# Patient Record
Sex: MALE | Race: WHITE | NOT HISPANIC OR LATINO | Employment: FULL TIME | URBAN - METROPOLITAN AREA
[De-identification: names, ages, dates, MRNs, and addresses within clinical notes are randomized per-mention and may not be internally consistent; named-entity substitution may affect disease eponyms.]

---

## 2021-10-08 ENCOUNTER — OFFICE VISIT (OUTPATIENT)
Dept: GASTROENTEROLOGY | Facility: CLINIC | Age: 31
End: 2021-10-08
Payer: COMMERCIAL

## 2021-10-08 VITALS
SYSTOLIC BLOOD PRESSURE: 160 MMHG | DIASTOLIC BLOOD PRESSURE: 90 MMHG | HEART RATE: 89 BPM | WEIGHT: 195 LBS | BODY MASS INDEX: 27.92 KG/M2 | HEIGHT: 70 IN

## 2021-10-08 DIAGNOSIS — R19.5 CHANGE IN STOOL CALIBER: ICD-10-CM

## 2021-10-08 DIAGNOSIS — R10.11 RIGHT UPPER QUADRANT ABDOMINAL PAIN: Primary | ICD-10-CM

## 2021-10-08 DIAGNOSIS — K62.5 RECTAL BLEEDING: ICD-10-CM

## 2021-10-08 DIAGNOSIS — R11.0 NAUSEA: ICD-10-CM

## 2021-10-08 DIAGNOSIS — R19.4 CHANGE IN BOWEL HABITS: ICD-10-CM

## 2021-10-08 PROCEDURE — 99204 OFFICE O/P NEW MOD 45 MIN: CPT | Performed by: INTERNAL MEDICINE

## 2021-10-08 RX ORDER — OMEPRAZOLE 40 MG/1
40 CAPSULE, DELAYED RELEASE ORAL DAILY
COMMUNITY
Start: 2021-08-17

## 2021-10-08 RX ORDER — LEVOCETIRIZINE DIHYDROCHLORIDE 5 MG/1
5 TABLET, FILM COATED ORAL DAILY
COMMUNITY
Start: 2021-09-24

## 2021-10-29 ENCOUNTER — TELEPHONE (OUTPATIENT)
Dept: GASTROENTEROLOGY | Facility: HOSPITAL | Age: 31
End: 2021-10-29

## 2021-11-01 ENCOUNTER — ANESTHESIA EVENT (OUTPATIENT)
Dept: GASTROENTEROLOGY | Facility: HOSPITAL | Age: 31
End: 2021-11-01

## 2021-11-01 ENCOUNTER — HOSPITAL ENCOUNTER (OUTPATIENT)
Dept: GASTROENTEROLOGY | Facility: HOSPITAL | Age: 31
Setting detail: OUTPATIENT SURGERY
Discharge: HOME | End: 2021-11-01
Attending: INTERNAL MEDICINE | Admitting: INTERNAL MEDICINE
Payer: COMMERCIAL

## 2021-11-01 ENCOUNTER — ANESTHESIA (OUTPATIENT)
Dept: GASTROENTEROLOGY | Facility: HOSPITAL | Age: 31
End: 2021-11-01

## 2021-11-01 VITALS
HEIGHT: 70 IN | DIASTOLIC BLOOD PRESSURE: 85 MMHG | WEIGHT: 198.85 LBS | HEART RATE: 61 BPM | OXYGEN SATURATION: 99 % | SYSTOLIC BLOOD PRESSURE: 121 MMHG | RESPIRATION RATE: 12 BRPM | BODY MASS INDEX: 28.47 KG/M2 | TEMPERATURE: 97.4 F

## 2021-11-01 DIAGNOSIS — R19.5 CHANGE IN STOOL CALIBER: ICD-10-CM

## 2021-11-01 DIAGNOSIS — R11.0 NAUSEA: ICD-10-CM

## 2021-11-01 DIAGNOSIS — K62.5 RECTAL BLEEDING: ICD-10-CM

## 2021-11-01 DIAGNOSIS — R19.4 CHANGE IN BOWEL HABITS: ICD-10-CM

## 2021-11-01 DIAGNOSIS — R10.11 RIGHT UPPER QUADRANT ABDOMINAL PAIN: ICD-10-CM

## 2021-11-01 PROCEDURE — 43239 EGD BIOPSY SINGLE/MULTIPLE: CPT | Performed by: INTERNAL MEDICINE

## 2021-11-01 PROCEDURE — 45385 COLONOSCOPY W/LESION REMOVAL: CPT | Performed by: INTERNAL MEDICINE

## 2021-11-01 PROCEDURE — 88305 TISSUE EXAM BY PATHOLOGIST: CPT | Performed by: PATHOLOGY

## 2021-11-01 RX ORDER — FENTANYL CITRATE 50 UG/ML
INJECTION, SOLUTION INTRAMUSCULAR; INTRAVENOUS AS NEEDED
Status: DISCONTINUED | OUTPATIENT
Start: 2021-11-01 | End: 2021-11-01

## 2021-11-01 RX ORDER — SODIUM CHLORIDE, SODIUM LACTATE, POTASSIUM CHLORIDE, CALCIUM CHLORIDE 600; 310; 30; 20 MG/100ML; MG/100ML; MG/100ML; MG/100ML
125 INJECTION, SOLUTION INTRAVENOUS CONTINUOUS
Status: DISCONTINUED | OUTPATIENT
Start: 2021-11-01 | End: 2021-11-05 | Stop reason: HOSPADM

## 2021-11-01 RX ORDER — PROPOFOL 10 MG/ML
INJECTION, EMULSION INTRAVENOUS AS NEEDED
Status: DISCONTINUED | OUTPATIENT
Start: 2021-11-01 | End: 2021-11-01

## 2021-11-01 RX ORDER — LIDOCAINE HYDROCHLORIDE 20 MG/ML
INJECTION, SOLUTION INFILTRATION; PERINEURAL AS NEEDED
Status: DISCONTINUED | OUTPATIENT
Start: 2021-11-01 | End: 2021-11-01

## 2021-11-01 RX ADMIN — LIDOCAINE HYDROCHLORIDE 5 ML: 20 INJECTION, SOLUTION INFILTRATION; PERINEURAL at 11:37

## 2021-11-01 RX ADMIN — PROPOFOL 50 MG: 10 INJECTION, EMULSION INTRAVENOUS at 11:42

## 2021-11-01 RX ADMIN — FENTANYL CITRATE 50 MCG: 50 INJECTION, SOLUTION INTRAMUSCULAR; INTRAVENOUS at 11:37

## 2021-11-01 RX ADMIN — SODIUM CHLORIDE, SODIUM LACTATE, POTASSIUM CHLORIDE, AND CALCIUM CHLORIDE 125 ML/HR: .6; .31; .03; .02 INJECTION, SOLUTION INTRAVENOUS at 11:08

## 2021-11-01 RX ADMIN — PROPOFOL 50 MG: 10 INJECTION, EMULSION INTRAVENOUS at 11:50

## 2021-11-01 RX ADMIN — PROPOFOL 100 MG: 10 INJECTION, EMULSION INTRAVENOUS at 11:37

## 2021-11-01 RX ADMIN — PROPOFOL 50 MG: 10 INJECTION, EMULSION INTRAVENOUS at 11:54

## 2021-11-01 RX ADMIN — PROPOFOL 50 MG: 10 INJECTION, EMULSION INTRAVENOUS at 11:47

## 2021-11-01 RX ADMIN — PROPOFOL 30 MG: 10 INJECTION, EMULSION INTRAVENOUS at 11:39

## 2021-12-30 NOTE — H&P (VIEW-ONLY)
Otolaryngology-- Head and Neck Surgery Follow up visit      Follow up:    10/28/21:  Juliana Everett is a 36 y o  who presents with a chief complaint of throat problem for over than 1 year  March 2021 seen by an ENT  US neck ordered  Had 3 rounds of Abx over 2 months with no improvement  Had CT scan May 2021 and as per patient it was normal   The pain is always on the right side      The patient is complaining of intermittent nasal blockage, excessive post nasal drip, facial pressure, occasional headache, frequent throat clearing, however he denied any history of sneezing, itchy eyes, itchy nose, chronic nasal drainage,loss of smell, chronic cough or nasal bleeding  There is no history of nasal trauma or surgeries  Also no history of bronchial asthma or aspirin sensitivity, however there is a history of drug sniffing [id number]   no recent CT scan sinuses  no allergy skin testing   On allergy medications  Tried Flonase not sure if it helped  Tried the Afrin for long time     The patient is complaining of right ear pain  There a history of ear PE tubes during childhood  The patient denied any history of drainage, hearing loss, tinnitus or vertigo  There is also no history of ear trauma or loud noise exposure  No significant family history of hearing loss at young age  Hearing tests performed today and showed:  Tympanogram bilateral type A  Audiogram normal     On Omeperazale for GERD and heart burn      CT neck May 2021 showed  Minimal asymmetric prominence of the soft tissues of the right nasopharynx in comparison to the contralateral right nasopharynx  Noted is effacement of left piriform sinus when compared to the contralateral right piriform sinus with thickening of the mucosa  Direct visual inspection is recommended  No lymphadenopathy by size criteria   No clear underlying mass or asymmetry throughout the aerodigestive tract        US neck March 2021  In the right anterior cervical region in the area of the palpable abnormality demonstrates two benign-appearing reniform lymph nodes with a small fatty hilum measuring 1 1 x 0 4 x 1 0 cm and 1 0 x 0 3 x 0 7 cm          21:  Here for persistent right sided throat pain, otalgia and irritation for more than 1 year  The patient tried different types of Abx, steroids, PPIs  floanse and saline rinse with no improvement  The patient is pointing towards his right tonsils  Old CT scan report at [facility] showed right tonsil calcification  Review of any relevant imaging:      Interval Review of systems: Pertinent review of systems documented in the HPI      Interval Social History:  Social History     Socioeconomic History    Marital status: Unknown     Spouse name: Not on file    Number of children: Not on file    Years of education: Not on file    Highest education level: Not on file   Occupational History    Not on file   Tobacco Use    Smoking status: Former Smoker     Packs/day: 0 50     Years: 15 00     Pack years: 7 50     Types: Cigarettes     Quit date: 2021     Years since quittin 4    Smokeless tobacco: Never Used   Vaping Use    Vaping Use: Never used   Substance and Sexual Activity    Alcohol use: Never    Drug use: Yes     Frequency: 7 0 times per week     Types: Marijuana     Comment: last usage 10/30/2021    Sexual activity: Not on file   Other Topics Concern    Not on file   Social History Narrative    Not on file     Social Determinants of Health     Financial Resource Strain: Not on file   Food Insecurity: Not on file   Transportation Needs: Not on file   Physical Activity: Not on file   Stress: Not on file   Social Connections: Not on file   Intimate Partner Violence: Not on file   Housing Stability: Not on file       Interval Physical Examination:  Temp (!) 97 3 °F (36 3 °C) (Temporal)   Ht 5' 10" (1 778 m)   Wt 95 3 kg (210 lb)   BMI 30 13 kg/m²   Head: Atraumatic, no visible scalp lesions, parotid and submandibular salivary glands non-tender to palpation and without masses bilaterally  Neck:  No visible or palpable cervical lesions or lymphadenopathy, thyroid gland is normal in size and symmetry and without masses, normal laryngeal elevation with swallowing  Ears:    Right ear :  Auricle normal in appearance, mastoid prominence non-tender, external auditory canal clear  Tympanic membranes intact  Left ear :  Auricle normal in appearance, mastoid prominence non-tender, external auditory canal clear   Tympanic membranes intact  Nose/Sinuses:  External appearance unremarkable, no maxillary or frontal sinus tenderness to palpation bilaterally  Anterior rhinoscopy reveals: thick mucus, pale mucosa  Oral Cavity:  Moist mucus membranes, gums and dentition unremarkable, no oral mucosal masses or lesions, floor of mouth soft, grade 1 + asymmetrical cryptic tonsils large on the right side       Flexible laryngoscopy performed:     Fiberoptic scope advanced through left nare, findings:  Nasal cavity: Septum deviated to the left, no polyps or mucopus  Nasopharynx: unremarkable, no masses or lesions, eustachian tube orifi and Fossae of Rosenmuller unremakable  Oropharynx: mucosa moist, no masses or lesions, tongue base, lateral and posterior walls normal  Larynx: True vocal folds mobile, no masses or lesions, widely patent glottic chink, arytenoid edema and erythema, evidence of PND  Hypopharynx: Pyriform sinuses clear without masses or pooling  Post-cricoid area unremarkable       Eyes:  Extra-ocular movements intact, pupils equally round and reactive to light and accommodation, the lids and conjunctivae are normal in appearance  Constitutional:  Well developed, well nourished and groomed, in no acute distress  Cardiovascular:  Normal rate and rhythm, no palpable thrills, no jugulovenous distension observed    Respiratory:  Normal respiratory effort without evidence of retractions or use of accessory muscles  Neurologic:  Cranial nerves II-XII intact bilaterally  Abdomen: Soft and lax  Extremities: No bruises   Psychiatric:  Alert and oriented to time, place and person  Procedure:  Flexible laryngoscopy performed: The nasal cavities were decongested with lidocaine and oxymetazoline spray  Endoscopy type: flexible  Results: look to the examination  The patient tolerated the procedure well  Assessment:  1  Otalgia of right ear  CT soft tissue neck with contrast   2  Odynophagia     3  Dysphagia, unspecified type     4  Rhinitis, chronic     5  Dysfunction of right eustachian tube     6  Gastroesophageal reflux disease without esophagitis     7  Post-nasal drip     8  Facial pressure     9  History of myringotomy     10  Rhinitis medicamentosa     11  Normal hearing test         Plan:    GERD  Continue on Omeprazole      Rhinitis, PND and rhinitis medica mentosa  Discontinue the nasal decongestant   Discussed treatment options including use of saline rinses and nasal steroids  Given instructions on use of nasal steroids and saline rinses          Right constant otalgia, throat pain and odynophagia  Plan to order CT neck to r/o foreign body in the right tonsills, tonsil or hypopharyngeal mass and long calcified styloid

## 2022-01-07 ENCOUNTER — HOSPITAL ENCOUNTER (OUTPATIENT)
Dept: RADIOLOGY | Facility: MEDICAL CENTER | Age: 32
Discharge: HOME | End: 2022-01-07
Payer: COMMERCIAL

## 2022-01-07 DIAGNOSIS — H92.01 OTALGIA OF RIGHT EAR: ICD-10-CM

## 2022-01-07 PROCEDURE — G1004 CDSM NDSC: HCPCS

## 2022-01-07 PROCEDURE — 70491 CT SOFT TISSUE NECK W/DYE: CPT

## 2022-01-07 RX ADMIN — IOHEXOL 100 ML: 350 INJECTION, SOLUTION INTRAVENOUS at 09:53

## 2022-01-14 ENCOUNTER — TELEPHONE (OUTPATIENT)
Dept: GASTROENTEROLOGY | Facility: CLINIC | Age: 32
End: 2022-01-14

## 2022-01-14 NOTE — TELEPHONE ENCOUNTER
Patients GI provider:  Dr Nash Boeck    Number to return call: [phone]    Reason for call: Pt calling looking for his results from egd/colon procedure from Nov    Scheduled procedure/appointment date if applicable: NA

## 2022-01-20 NOTE — PRE-PROCEDURE INSTRUCTIONS
Pre-Surgery Instructions:   Medication Instructions    levocetirizine (XYZAL) 5 MG tablet Instructed to take per normal schedule except DOS    omeprazole (PriLOSEC) 40 MG capsule Instructed to take per normal schedule including DOS with sips water   Have you had / have a sore throat? No  Have you had / have a cough less than 1 week? No  Have you had / have a fever greater than 100 0 - 100  4? No  Are you experiencing any shortness of breath? No    Review with patient via phone medications and showering instructions  Advised don't take NSAID's, ok tylenol products  Advised ASC call with surgery schedule time, nothing eat or drink after midnight  Verbalized understanding

## 2022-01-21 ENCOUNTER — APPOINTMENT (OUTPATIENT)
Dept: LAB | Facility: HOSPITAL | Age: 32
End: 2022-01-21
Payer: COMMERCIAL

## 2022-01-21 DIAGNOSIS — J35.8 TONSILLAR MASS: ICD-10-CM

## 2022-01-21 LAB
ANION GAP SERPL CALCULATED.3IONS-SCNC: 9 MMOL/L (ref 4–13)
BASOPHILS # BLD AUTO: 0.06 THOUSANDS/ΜL (ref 0–0.1)
BASOPHILS NFR BLD AUTO: 1 % (ref 0–1)
BUN SERPL-MCNC: 14 MG/DL (ref 5–25)
CALCIUM SERPL-MCNC: 9.5 MG/DL (ref 8.3–10.1)
CHLORIDE SERPL-SCNC: 103 MMOL/L (ref 100–108)
CO2 SERPL-SCNC: 29 MMOL/L (ref 21–32)
CREAT SERPL-MCNC: 0.99 MG/DL (ref 0.6–1.3)
EOSINOPHIL # BLD AUTO: 0.4 THOUSAND/ΜL (ref 0–0.61)
EOSINOPHIL NFR BLD AUTO: 5 % (ref 0–6)
ERYTHROCYTE [DISTWIDTH] IN BLOOD BY AUTOMATED COUNT: 12.6 % (ref 11.6–15.1)
GFR SERPL CREATININE-BSD FRML MDRD: 102 ML/MIN/1.73SQ M
GLUCOSE P FAST SERPL-MCNC: 93 MG/DL (ref 65–99)
HCT VFR BLD AUTO: 45.5 % (ref 36.5–49.3)
HGB BLD-MCNC: 15.4 G/DL (ref 12–17)
IMM GRANULOCYTES # BLD AUTO: 0.02 THOUSAND/UL (ref 0–0.2)
IMM GRANULOCYTES NFR BLD AUTO: 0 % (ref 0–2)
LYMPHOCYTES # BLD AUTO: 2.67 THOUSANDS/ΜL (ref 0.6–4.47)
LYMPHOCYTES NFR BLD AUTO: 34 % (ref 14–44)
MCH RBC QN AUTO: 30.9 PG (ref 26.8–34.3)
MCHC RBC AUTO-ENTMCNC: 33.8 G/DL (ref 31.4–37.4)
MCV RBC AUTO: 91 FL (ref 82–98)
MONOCYTES # BLD AUTO: 0.57 THOUSAND/ΜL (ref 0.17–1.22)
MONOCYTES NFR BLD AUTO: 7 % (ref 4–12)
NEUTROPHILS # BLD AUTO: 4.17 THOUSANDS/ΜL (ref 1.85–7.62)
NEUTS SEG NFR BLD AUTO: 53 % (ref 43–75)
NRBC BLD AUTO-RTO: 0 /100 WBCS
PLATELET # BLD AUTO: 278 THOUSANDS/UL (ref 149–390)
PMV BLD AUTO: 10.4 FL (ref 8.9–12.7)
POTASSIUM SERPL-SCNC: 3.8 MMOL/L (ref 3.5–5.3)
RBC # BLD AUTO: 4.99 MILLION/UL (ref 3.88–5.62)
SODIUM SERPL-SCNC: 141 MMOL/L (ref 136–145)
WBC # BLD AUTO: 7.89 THOUSAND/UL (ref 4.31–10.16)

## 2022-01-21 PROCEDURE — 80048 BASIC METABOLIC PNL TOTAL CA: CPT

## 2022-01-21 PROCEDURE — 85025 COMPLETE CBC W/AUTO DIFF WBC: CPT

## 2022-01-21 PROCEDURE — 36415 COLL VENOUS BLD VENIPUNCTURE: CPT

## 2022-01-27 ENCOUNTER — ANESTHESIA EVENT (OUTPATIENT)
Dept: PERIOP | Facility: HOSPITAL | Age: 32
End: 2022-01-27
Payer: COMMERCIAL

## 2022-01-28 ENCOUNTER — ANESTHESIA (OUTPATIENT)
Dept: PERIOP | Facility: HOSPITAL | Age: 32
End: 2022-01-28
Payer: COMMERCIAL

## 2022-01-28 ENCOUNTER — HOSPITAL ENCOUNTER (OUTPATIENT)
Facility: HOSPITAL | Age: 32
Setting detail: OUTPATIENT SURGERY
Discharge: HOME | End: 2022-01-28
Attending: STUDENT IN AN ORGANIZED HEALTH CARE EDUCATION/TRAINING PROGRAM | Admitting: STUDENT IN AN ORGANIZED HEALTH CARE EDUCATION/TRAINING PROGRAM
Payer: COMMERCIAL

## 2022-01-28 VITALS
SYSTOLIC BLOOD PRESSURE: 136 MMHG | OXYGEN SATURATION: 99 % | TEMPERATURE: 96.7 F | RESPIRATION RATE: 15 BRPM | DIASTOLIC BLOOD PRESSURE: 83 MMHG | HEIGHT: 70 IN | BODY MASS INDEX: 30.06 KG/M2 | HEART RATE: 63 BPM | WEIGHT: 210 LBS

## 2022-01-28 DIAGNOSIS — Z90.89 S/P TONSILLECTOMY: Primary | ICD-10-CM

## 2022-01-28 PROCEDURE — 42826 REMOVAL OF TONSILS: CPT | Performed by: STUDENT IN AN ORGANIZED HEALTH CARE EDUCATION/TRAINING PROGRAM

## 2022-01-28 RX ORDER — SUCCINYLCHOLINE/SOD CL,ISO/PF 100 MG/5ML
SYRINGE (ML) INTRAVENOUS AS NEEDED
Status: DISCONTINUED | OUTPATIENT
Start: 2022-01-28 | End: 2022-01-28

## 2022-01-28 RX ORDER — OXYCODONE HYDROCHLORIDE 5 MG/1
5 TABLET ORAL EVERY 6 HOURS PRN
Qty: 1 TABLET | Refills: 0 | Status: SHIPPED | OUTPATIENT
Start: 2022-01-28 | End: 2022-01-28

## 2022-01-28 RX ORDER — HYDROMORPHONE HCL/PF 1 MG/ML
0.5 SYRINGE (ML) INJECTION ONCE
Status: DISCONTINUED | OUTPATIENT
Start: 2022-01-28 | End: 2022-01-28 | Stop reason: HOSPADM

## 2022-01-28 RX ORDER — LIDOCAINE HYDROCHLORIDE 10 MG/ML
INJECTION, SOLUTION EPIDURAL; INFILTRATION; INTRACAUDAL; PERINEURAL AS NEEDED
Status: DISCONTINUED | OUTPATIENT
Start: 2022-01-28 | End: 2022-01-28

## 2022-01-28 RX ORDER — OXYCODONE HYDROCHLORIDE 5 MG/1
5 TABLET ORAL EVERY 4 HOURS PRN
Status: DISCONTINUED | OUTPATIENT
Start: 2022-01-28 | End: 2022-01-28 | Stop reason: HOSPADM

## 2022-01-28 RX ORDER — FENTANYL CITRATE/PF 50 MCG/ML
25 SYRINGE (ML) INJECTION
Status: COMPLETED | OUTPATIENT
Start: 2022-01-28 | End: 2022-01-28

## 2022-01-28 RX ORDER — MAGNESIUM HYDROXIDE 1200 MG/15ML
LIQUID ORAL AS NEEDED
Status: DISCONTINUED | OUTPATIENT
Start: 2022-01-28 | End: 2022-01-28 | Stop reason: HOSPADM

## 2022-01-28 RX ORDER — ONDANSETRON 2 MG/ML
INJECTION INTRAMUSCULAR; INTRAVENOUS AS NEEDED
Status: DISCONTINUED | OUTPATIENT
Start: 2022-01-28 | End: 2022-01-28

## 2022-01-28 RX ORDER — FENTANYL CITRATE 50 UG/ML
INJECTION, SOLUTION INTRAMUSCULAR; INTRAVENOUS AS NEEDED
Status: DISCONTINUED | OUTPATIENT
Start: 2022-01-28 | End: 2022-01-28

## 2022-01-28 RX ORDER — ACETAMINOPHEN 160 MG/5ML
640 SUSPENSION ORAL EVERY 4 HOURS PRN
Qty: 473 ML | Refills: 0 | Status: SHIPPED | OUTPATIENT
Start: 2022-01-28 | End: 2022-01-31

## 2022-01-28 RX ORDER — DEXAMETHASONE SODIUM PHOSPHATE 10 MG/ML
INJECTION, SOLUTION INTRAMUSCULAR; INTRAVENOUS AS NEEDED
Status: DISCONTINUED | OUTPATIENT
Start: 2022-01-28 | End: 2022-01-28

## 2022-01-28 RX ORDER — SODIUM CHLORIDE, SODIUM LACTATE, POTASSIUM CHLORIDE, CALCIUM CHLORIDE 600; 310; 30; 20 MG/100ML; MG/100ML; MG/100ML; MG/100ML
125 INJECTION, SOLUTION INTRAVENOUS CONTINUOUS
Status: DISCONTINUED | OUTPATIENT
Start: 2022-01-28 | End: 2022-01-28 | Stop reason: HOSPADM

## 2022-01-28 RX ORDER — ONDANSETRON 2 MG/ML
4 INJECTION INTRAMUSCULAR; INTRAVENOUS ONCE AS NEEDED
Status: DISCONTINUED | OUTPATIENT
Start: 2022-01-28 | End: 2022-01-28 | Stop reason: HOSPADM

## 2022-01-28 RX ORDER — PROPOFOL 10 MG/ML
INJECTION, EMULSION INTRAVENOUS AS NEEDED
Status: DISCONTINUED | OUTPATIENT
Start: 2022-01-28 | End: 2022-01-28

## 2022-01-28 RX ORDER — LIDOCAINE HYDROCHLORIDE 10 MG/ML
0.5 INJECTION, SOLUTION EPIDURAL; INFILTRATION; INTRACAUDAL; PERINEURAL ONCE AS NEEDED
Status: DISCONTINUED | OUTPATIENT
Start: 2022-01-28 | End: 2022-01-28 | Stop reason: HOSPADM

## 2022-01-28 RX ADMIN — DEXAMETHASONE SODIUM PHOSPHATE 4 MG: 10 INJECTION, SOLUTION INTRAMUSCULAR; INTRAVENOUS at 08:46

## 2022-01-28 RX ADMIN — FENTANYL CITRATE 50 MCG: 50 INJECTION INTRAMUSCULAR; INTRAVENOUS at 08:33

## 2022-01-28 RX ADMIN — FENTANYL CITRATE 50 MCG: 50 INJECTION INTRAMUSCULAR; INTRAVENOUS at 08:46

## 2022-01-28 RX ADMIN — OXYCODONE HYDROCHLORIDE 5 MG: 5 TABLET ORAL at 11:51

## 2022-01-28 RX ADMIN — LIDOCAINE HYDROCHLORIDE 50 MG: 10 INJECTION, SOLUTION EPIDURAL; INFILTRATION; INTRACAUDAL; PERINEURAL at 08:37

## 2022-01-28 RX ADMIN — Medication 25 MCG: at 09:47

## 2022-01-28 RX ADMIN — ONDANSETRON 4 MG: 2 INJECTION INTRAMUSCULAR; INTRAVENOUS at 08:46

## 2022-01-28 RX ADMIN — PROPOFOL 200 MG: 10 INJECTION, EMULSION INTRAVENOUS at 08:33

## 2022-01-28 RX ADMIN — Medication 25 MCG: at 10:03

## 2022-01-28 RX ADMIN — Medication 25 MCG: at 09:54

## 2022-01-28 RX ADMIN — Medication 25 MCG: at 10:12

## 2022-01-28 RX ADMIN — Medication 100 MG: at 08:33

## 2022-01-28 RX ADMIN — SODIUM CHLORIDE, SODIUM LACTATE, POTASSIUM CHLORIDE, AND CALCIUM CHLORIDE 125 ML/HR: .6; .31; .03; .02 INJECTION, SOLUTION INTRAVENOUS at 07:01

## 2022-01-28 NOTE — INTERVAL H&P NOTE
Plan for tonsillectomy and possible adenoidectomy  Risks and benefits explained to the mother in details including but not limited to bleeding, infection and injury to surrounding structures  H&P reviewed  After examining the patient I find no changes in the patients condition since the H&P had been written      Vitals:    01/28/22 0647   BP: 145/84   Pulse: 60   Resp: 19   Temp: 97 8 °F (36 6 °C)   SpO2: 99%

## 2022-01-28 NOTE — ANESTHESIA PREPROCEDURE EVALUATION
Procedure:  TONSILLECTOMY (N/A Throat)    Relevant Problems   No relevant active problems        Physical Exam    Airway    Mallampati score: II         Dental   No notable dental hx     Cardiovascular      Pulmonary      Other Findings        Anesthesia Plan  ASA Score- 2     Anesthesia Type- general with ASA Monitors  Additional Monitors:   Airway Plan: ETT  Comment: I, Dr Mariola Steele, the attending physician, have personally seen and evaluated the patient prior to anesthetic care  I have reviewed the pre-anesthetic record, and other medical records if appropriate to the anesthetic care  If a CRNA is involved in the case, I have reviewed the CRNA assessment, if present, and agree  The patient is in a suitable condition to proceed with my formulated anesthetic plan          Plan Factors-    Chart reviewed  Induction- intravenous  Postoperative Plan-     Informed Consent- Anesthetic plan and risks discussed with patient  I personally reviewed this patient with the CRNA  Discussed and agreed on the Anesthesia Plan with the CRNA  Castillo Aguirre

## 2022-01-28 NOTE — DISCHARGE INSTRUCTIONS
Tonsillectomy   WHAT YOU NEED TO KNOW:   A tonsillectomy is surgery to remove your tonsils  Tonsils are 2 large lumps of tissue in the back of your throat  Your tonsils may need to be removed if you have frequent throat infections or trouble breathing during sleep  DISCHARGE INSTRUCTIONS:   Seek care immediately if:   · You have trouble breathing  · You have signs of dehydration, such as dry mouth or eyes  You may urinate less than usual or not at all  · You have severe pain  · You are vomiting  · You have a fever higher than 102°F (39°C), or a low-grade fever for longer than 2 days  · You have bright red bleeding from your throat, nose, or mouth, or your bleeding that gets worse  Call your surgeon or doctor if:   · You have new or worsening symptoms  · You have questions or concerns about your condition or care  Medicines: You may  need any of the following:  · Prescription pain medicine  may be given  Ask your healthcare provider how to take this medicine safely  Some prescription pain medicines contain acetaminophen  Do not take other medicines that contain acetaminophen without talking to your healthcare provider  Too much acetaminophen may cause liver damage  Prescription pain medicine may cause constipation  Ask your healthcare provider how to prevent or treat constipation  · Acetaminophen  decreases pain and fever  It is available without a doctor's order  Ask how much to take and how often to take it  Follow directions  Read the labels of all other medicines you are using to see if they also contain acetaminophen, or ask your doctor or pharmacist  Acetaminophen can cause liver damage if not taken correctly  Do not use more than 4 grams (4,000 milligrams) total of acetaminophen in one day  · NSAIDs , such as ibuprofen, help decrease swelling, pain, and fever  NSAIDs can cause stomach bleeding or kidney problems in certain people   If you take blood thinner medicine, always ask your healthcare provider if NSAIDs are safe for you  Always read the medicine label and follow directions  · Take your medicine as directed  Contact your healthcare provider if you think your medicine is not helping or if you have side effects  Tell him or her if you are allergic to any medicine  Keep a list of the medicines, vitamins, and herbs you take  Include the amounts, and when and why you take them  Bring the list or the pill bottles to follow-up visits  Carry your medicine list with you in case of an emergency  Self-care:   · Drink liquids as directed  This will help prevent dehydration  Liquids and foods that are cool or cold, such as water, apple or grape juice, and popsicles, will help decrease pain and swelling  Do not drink citrus juices, such as orange juice or grapefruit juice  They may irritate your throat  Do not drink hot liquids such as coffee, tea, or soup  These liquids may hurt your throat  · Do not use straws  for up to 2 weeks, or as directed by your healthcare provider  Drinking from a straw may increase your risk for bleeding  · Eat soft foods  for 10 to 14 days to decrease pain while you eat  Examples of soft foods include applesauce, scrambled or boiled eggs, mashed potatoes, gelatin, and ice cream  Once you can eat soft food easily, you may slowly begin to eat solid foods  Avoid anything hot, spicy, or with sharp edges, such as chips  · Care for your mouth as directed  Gently rinse your mouth as directed to remove blood and mucus  The white scabs that will form in the back of your throat will cause bad breath  This is normal  Brush your teeth gently  Avoid harsh gargling or tooth brushing  This can cause bleeding  · Limit your activity for 7 to 10 days after surgery  Get plenty of rest  It may take 2 weeks for you to recover  Ask when you can drive or return to work  · Do not smoke    Nicotine and other chemicals in cigarettes and cigars can prevent healing after surgery  Ask your healthcare provider for information if you currently smoke and need help to quit  E-cigarettes or smokeless tobacco still contain nicotine  Talk to your healthcare provider before you use these products  Follow up with your surgeon or doctor as directed:  Write down your questions so you remember to ask them during your visits  © [employer] 2021 Information is for End User's use only and may not be sold, redistributed or otherwise used for commercial purposes  All illustrations and images included in CareNotes® are the copyrighted property of [employer]  or [facility]   The above information is an  only  It is not intended as medical advice for individual conditions or treatments  Talk to your doctor, nurse or pharmacist before following any medical regimen to see if it is safe and effective for you

## 2022-01-28 NOTE — PROGRESS NOTES
I find no changes with the H and P performed on 12/30/21 as shown below  Patient with right otalgia and tonsillar asymmetry here for tonsillectomy  Risks, benefits, alternatives, and typical post operative course reviewed with the patient  All of the patient's questions were answered to the patient's satisfaction  Patient agrees to proceed with the procedure  Otolaryngology-- Head and Neck Surgery Follow up visit 12/30/21      Follow up:    10/28/21:  Edouard Doctor is a 36 y o  who presents with a chief complaint of throat problem for over than 1 year  March 2021 seen by an ENT  US neck ordered  Had 3 rounds of Abx over 2 months with no improvement  Had CT scan May 2021 and as per patient it was normal   The pain is always on the right side      The patient is complaining of intermittent nasal blockage, excessive post nasal drip, facial pressure, occasional headache, frequent throat clearing, however he denied any history of sneezing, itchy eyes, itchy nose, chronic nasal drainage,loss of smell, chronic cough or nasal bleeding  There is no history of nasal trauma or surgeries  Also no history of bronchial asthma or aspirin sensitivity, however there is a history of drug sniffing [id number]   no recent CT scan sinuses  no allergy skin testing   On allergy medications  Tried Flonase not sure if it helped  Tried the Afrin for long time     The patient is complaining of right ear pain  There a history of ear PE tubes during childhood  The patient denied any history of drainage, hearing loss, tinnitus or vertigo  There is also no history of ear trauma or loud noise exposure  No significant family history of hearing loss at young age    Hearing tests performed today and showed:  Tympanogram bilateral type A  Audiogram normal     On Omeperazale for GERD and heart burn      CT neck May 2021 showed  Minimal asymmetric prominence of the soft tissues of the right nasopharynx in comparison to the contralateral right nasopharynx  Noted is effacement of left piriform sinus when compared to the contralateral right piriform sinus with thickening of the mucosa  Direct visual inspection is recommended  No lymphadenopathy by size criteria  No clear underlying mass or asymmetry throughout the aerodigestive tract        US neck 2021  In the right anterior cervical region in the area of the palpable abnormality demonstrates two benign-appearing reniform lymph nodes with a small fatty hilum measuring 1 1 x 0 4 x 1 0 cm and 1 0 x 0 3 x 0 7 cm          21:  Here for persistent right sided throat pain, otalgia and irritation for more than 1 year  The patient tried different types of Abx, steroids, PPIs  floanse and saline rinse with no improvement  The patient is pointing towards his right tonsils  Old CT scan report at [facility] [place] showed right tonsil calcification  Review of any relevant imaging:      Interval Review of systems: Pertinent review of systems documented in the HPI      Interval Social History:  Social History     Socioeconomic History    Marital status: Unknown     Spouse name: Not on file    Number of children: Not on file    Years of education: Not on file    Highest education level: Not on file   Occupational History    Not on file   Tobacco Use    Smoking status: Former Smoker     Packs/day: 0 50     Years: 15 00     Pack years: 7 50     Types: Cigarettes     Quit date: 2021     Years since quittin 5    Smokeless tobacco: Never Used   Vaping Use    Vaping Use: Never used   Substance and Sexual Activity    Alcohol use: Never    Drug use: Yes     Frequency: 7 0 times per week     Types: Marijuana    Sexual activity: Not on file   Other Topics Concern    Not on file   Social History Narrative    Not on file     Social Determinants of Health     Financial Resource Strain: Not on file   Food Insecurity: Not on file   Transportation Needs: Not on file   Physical Activity: Not on file Stress: Not on file   Social Connections: Not on file   Intimate Partner Violence: Not on file   Housing Stability: Not on file       Interval Physical Examination:  /84   Pulse 60   Temp 97 8 °F (36 6 °C) (Temporal)   Resp 19   Ht 5' 10" (1 778 m)   Wt 95 3 kg (210 lb)   SpO2 99%   BMI 30 13 kg/m²   Head: Atraumatic, no visible scalp lesions, parotid and submandibular salivary glands non-tender to palpation and without masses bilaterally  Neck:  No visible or palpable cervical lesions or lymphadenopathy, thyroid gland is normal in size and symmetry and without masses, normal laryngeal elevation with swallowing  Ears:    Right ear :  Auricle normal in appearance, mastoid prominence non-tender, external auditory canal clear  Tympanic membranes intact  Left ear :  Auricle normal in appearance, mastoid prominence non-tender, external auditory canal clear   Tympanic membranes intact  Nose/Sinuses:  External appearance unremarkable, no maxillary or frontal sinus tenderness to palpation bilaterally  Anterior rhinoscopy reveals: thick mucus, pale mucosa  Oral Cavity:  Moist mucus membranes, gums and dentition unremarkable, no oral mucosal masses or lesions, floor of mouth soft, grade 1 + asymmetrical cryptic tonsils large on the right side       Flexible laryngoscopy performed:     Fiberoptic scope advanced through left nare, findings:  Nasal cavity: Septum deviated to the left, no polyps or mucopus  Nasopharynx: unremarkable, no masses or lesions, eustachian tube orifi and Fossae of Rosenmuller unremakable  Oropharynx: mucosa moist, no masses or lesions, tongue base, lateral and posterior walls normal  Larynx: True vocal folds mobile, no masses or lesions, widely patent glottic chink, arytenoid edema and erythema, evidence of PND  Hypopharynx: Pyriform sinuses clear without masses or pooling   Post-cricoid area unremarkable       Eyes:  Extra-ocular movements intact, pupils equally round and reactive to light and accommodation, the lids and conjunctivae are normal in appearance  Constitutional:  Well developed, well nourished and groomed, in no acute distress  Cardiovascular:  Normal rate and rhythm, no palpable thrills, no jugulovenous distension observed  Respiratory:  Normal respiratory effort without evidence of retractions or use of accessory muscles  Neurologic:  Cranial nerves II-XII intact bilaterally  Abdomen: Soft and lax  Extremities: No bruises   Psychiatric:  Alert and oriented to time, place and person  Procedure:  Flexible laryngoscopy performed: The nasal cavities were decongested with lidocaine and oxymetazoline spray  Endoscopy type: flexible  Results: look to the examination  The patient tolerated the procedure well  Assessment:  No diagnosis found  Plan:    GERD  Continue on Omeprazole      Rhinitis, PND and rhinitis medica mentosa  Discontinue the nasal decongestant   Discussed treatment options including use of saline rinses and nasal steroids  Given instructions on use of nasal steroids and saline rinses          Right constant otalgia, throat pain and odynophagia  Plan to order CT neck to r/o foreign body in the right tonsills, tonsil or hypopharyngeal mass and long calcified styloid

## 2022-01-28 NOTE — ANESTHESIA POSTPROCEDURE EVALUATION
Post-Op Assessment Note    CV Status:  Stable  Pain Score: 0    Pain management: satisfactory to patient     Mental Status:  Alert   Hydration Status:  Stable   PONV Controlled:  None   Airway Patency:  Patent  Airway: intubated      Post Op Vitals Reviewed: Yes      Staff: CRNA         No complications documented      /75 (01/28/22 0941)    Temp (!) 97 1 °F (36 2 °C) (01/28/22 0941)    Pulse 80 (01/28/22 0941)   Resp   16   SpO2 100 % (01/28/22 0941)

## 2022-01-28 NOTE — OP NOTE
PERATIVE REPORT  PATIENT NAME: Edouard Ramos    :  1990  MRN: [id number]  Pt Location:  OR ROOM 06    SURGERY DATE: 2022    Surgeon(s) and Role:     * Orquidea Lopez MD - Primary     * Heather Jarrell MD - Assisting    Preop Diagnosis:  Tonsillar mass [J35 8]    Post-Op Diagnosis Codes:     * Tonsillar mass [J35 8]    Procedure(s) (LRB):  TONSILLECTOMY (N/A)    Specimen(s):  * No specimens in log *    Estimated Blood Loss:   Minimal    Drains:  * No LDAs found *    Anesthesia Type:   General    Operative Indications: Tonsillar mass [J35 8]  Tonsillar stones    Operative Findings:  Grade 3 + tonsills    Complications:   None    Procedure and Technique:  The patient was positively identified and transferred onto the operating table in the supine position  Appropriate monitoring devices were put in place, anesthesia was induced and the patient was intubated without difficulty  The operating room table was then turned 90 degrees, and a shoulder roll was placed  Before proceeding further, the time out procedure was completed  A McIvor oral gag was introduced opened and suspended from the edge of the Mandujano stand  Palpation of the hard palate revealed no submucosal cleft  Red rubber tubes were passed through bilateral nasal cavities and used to retract the soft palate bilaterally  The right tonsil was grasped, retracted medially and dissected free of the surrounding tissue using the Coblation wand  In a similar fashion, the left tonsil was removed, and hemostasis was accomplished in bilateral tonsillar fossae using the coagulation function of the Coblation wand  Attention was directed to the nasopharynx, where no enlarged adenoids noticed  The McIvor oral gag was let down for a minute and reopened  Hemostasis was again accomplished using the coagulation function of the Coablation wand  The red rubber tubes and the McIvor oral gag were then removed  Anesthesia was reversed   The patient was awakened, extubated and taken to the recovery room in stable condition  All counts were correct at the end of the case, and no complications were encountered       I was present for the entire procedure    Patient Disposition:  PACU       SIGNATURE: Maurisio Aguirre MD  DATE: January 28, 2022  TIME: 9:30 AM

## 2022-01-31 ENCOUNTER — TELEPHONE (OUTPATIENT)
Dept: OTOLARYNGOLOGY | Facility: CLINIC | Age: 32
End: 2022-01-31

## 2022-01-31 DIAGNOSIS — Z90.89 S/P TONSILLECTOMY: Primary | ICD-10-CM

## 2022-01-31 RX ORDER — ACETAMINOPHEN 160 MG/5ML
1000 SUSPENSION ORAL EVERY 4 HOURS PRN
Qty: 1000 ML | Refills: 0 | Status: SHIPPED | OUTPATIENT
Start: 2022-01-31

## 2022-01-31 RX ORDER — METHYLPREDNISOLONE 4 MG/1
TABLET ORAL
Qty: 1 TABLET | Refills: 0 | Status: SHIPPED | OUTPATIENT
Start: 2022-01-31

## 2022-06-10 ENCOUNTER — HOSPITAL ENCOUNTER (OUTPATIENT)
Dept: RADIOLOGY | Facility: MEDICAL CENTER | Age: 32
Discharge: HOME | End: 2022-06-10
Payer: COMMERCIAL

## 2022-06-10 DIAGNOSIS — R10.11 RIGHT UPPER QUADRANT PAIN: ICD-10-CM

## 2022-06-10 PROCEDURE — 74176 CT ABD & PELVIS W/O CONTRAST: CPT

## 2022-06-10 PROCEDURE — G1004 CDSM NDSC: HCPCS

## 2022-06-24 ENCOUNTER — APPOINTMENT (OUTPATIENT)
Dept: LAB | Facility: HOSPITAL | Age: 32
End: 2022-06-24
Payer: COMMERCIAL

## 2022-06-24 ENCOUNTER — HOSPITAL ENCOUNTER (OUTPATIENT)
Dept: MRI IMAGING | Facility: HOSPITAL | Age: 32
Discharge: HOME | End: 2022-06-24
Attending: STUDENT IN AN ORGANIZED HEALTH CARE EDUCATION/TRAINING PROGRAM
Payer: COMMERCIAL

## 2022-06-24 DIAGNOSIS — K11.20 SIALOADENITIS: ICD-10-CM

## 2022-06-24 LAB
ANION GAP SERPL CALCULATED.3IONS-SCNC: 9 MMOL/L (ref 4–13)
BUN SERPL-MCNC: 13 MG/DL (ref 5–25)
CALCIUM SERPL-MCNC: 9.1 MG/DL (ref 8.3–10.1)
CHLORIDE SERPL-SCNC: 104 MMOL/L (ref 100–108)
CO2 SERPL-SCNC: 26 MMOL/L (ref 21–32)
CREAT SERPL-MCNC: 1 MG/DL (ref 0.6–1.3)
GFR SERPL CREATININE-BSD FRML MDRD: 100 ML/MIN/1.73SQ M
GLUCOSE P FAST SERPL-MCNC: 88 MG/DL (ref 65–99)
POTASSIUM SERPL-SCNC: 4.1 MMOL/L (ref 3.5–5.3)
SODIUM SERPL-SCNC: 139 MMOL/L (ref 136–145)

## 2022-06-24 PROCEDURE — G1004 CDSM NDSC: HCPCS

## 2022-06-24 PROCEDURE — 36415 COLL VENOUS BLD VENIPUNCTURE: CPT

## 2022-06-24 PROCEDURE — 70543 MRI ORBT/FAC/NCK W/O &W/DYE: CPT

## 2022-06-24 PROCEDURE — A9585 GADOBUTROL INJECTION: HCPCS | Performed by: STUDENT IN AN ORGANIZED HEALTH CARE EDUCATION/TRAINING PROGRAM

## 2022-06-24 PROCEDURE — 80048 BASIC METABOLIC PNL TOTAL CA: CPT

## 2022-06-24 RX ADMIN — GADOBUTROL 9 ML: 604.72 INJECTION INTRAVENOUS at 22:45

## 2022-07-29 ENCOUNTER — HOSPITAL ENCOUNTER (OUTPATIENT)
Dept: MRI IMAGING | Facility: HOSPITAL | Age: 32
Discharge: HOME | End: 2022-07-29
Payer: COMMERCIAL

## 2022-07-29 DIAGNOSIS — R16.0 HEPATOMEGALY, NOT ELSEWHERE CLASSIFIED: ICD-10-CM

## 2022-07-29 PROCEDURE — 74183 MRI ABD W/O CNTR FLWD CNTR: CPT

## 2022-07-29 PROCEDURE — A9585 GADOBUTROL INJECTION: HCPCS | Performed by: RADIOLOGY

## 2022-07-29 PROCEDURE — G1004 CDSM NDSC: HCPCS

## 2022-07-29 RX ADMIN — GADOBUTROL 9 ML: 604.72 INJECTION INTRAVENOUS at 21:25

## 2024-07-27 ENCOUNTER — APPOINTMENT (EMERGENCY)
Dept: CT IMAGING | Facility: HOSPITAL | Age: 34
End: 2024-07-27
Payer: COMMERCIAL

## 2024-07-27 ENCOUNTER — HOSPITAL ENCOUNTER (EMERGENCY)
Facility: HOSPITAL | Age: 34
Discharge: HOME | End: 2024-07-27
Attending: STUDENT IN AN ORGANIZED HEALTH CARE EDUCATION/TRAINING PROGRAM | Admitting: STUDENT IN AN ORGANIZED HEALTH CARE EDUCATION/TRAINING PROGRAM
Payer: COMMERCIAL

## 2024-07-27 VITALS
BODY MASS INDEX: 28.82 KG/M2 | WEIGHT: 201.28 LBS | OXYGEN SATURATION: 100 % | DIASTOLIC BLOOD PRESSURE: 93 MMHG | SYSTOLIC BLOOD PRESSURE: 168 MMHG | HEIGHT: 70 IN | HEART RATE: 80 BPM | RESPIRATION RATE: 20 BRPM | TEMPERATURE: 98.1 F

## 2024-07-27 DIAGNOSIS — R93.89 ABNORMAL CT SCAN: ICD-10-CM

## 2024-07-27 DIAGNOSIS — R10.9 ABDOMINAL PAIN: Primary | ICD-10-CM

## 2024-07-27 LAB
ALBUMIN SERPL BCG-MCNC: 4.8 G/DL (ref 3.5–5)
ALP SERPL-CCNC: 45 U/L (ref 34–104)
ALT SERPL W P-5'-P-CCNC: 18 U/L (ref 7–52)
ANION GAP SERPL CALCULATED.3IONS-SCNC: 11 MMOL/L (ref 4–13)
AST SERPL W P-5'-P-CCNC: 15 U/L (ref 13–39)
BASOPHILS # BLD AUTO: 0.05 THOUSANDS/ÂΜL (ref 0–0.1)
BASOPHILS NFR BLD AUTO: 1 % (ref 0–1)
BILIRUB SERPL-MCNC: 1.55 MG/DL (ref 0.2–1)
BUN SERPL-MCNC: 8 MG/DL (ref 5–25)
CALCIUM SERPL-MCNC: 9.6 MG/DL (ref 8.4–10.2)
CHLORIDE SERPL-SCNC: 102 MMOL/L (ref 96–108)
CO2 SERPL-SCNC: 25 MMOL/L (ref 21–32)
CREAT SERPL-MCNC: 1.07 MG/DL (ref 0.6–1.3)
EOSINOPHIL # BLD AUTO: 0.19 THOUSAND/ÂΜL (ref 0–0.61)
EOSINOPHIL NFR BLD AUTO: 3 % (ref 0–6)
ERYTHROCYTE [DISTWIDTH] IN BLOOD BY AUTOMATED COUNT: 12.3 % (ref 11.6–15.1)
GFR SERPL CREATININE-BSD FRML MDRD: 91 ML/MIN/1.73SQ M
GLUCOSE SERPL-MCNC: 104 MG/DL (ref 65–140)
HCT VFR BLD AUTO: 42.2 % (ref 36.5–49.3)
HGB BLD-MCNC: 14.4 G/DL (ref 12–17)
IMM GRANULOCYTES # BLD AUTO: 0.02 THOUSAND/UL (ref 0–0.2)
IMM GRANULOCYTES NFR BLD AUTO: 0 % (ref 0–2)
LIPASE SERPL-CCNC: 10 U/L (ref 11–82)
LYMPHOCYTES # BLD AUTO: 3.06 THOUSANDS/ÂΜL (ref 0.6–4.47)
LYMPHOCYTES NFR BLD AUTO: 42 % (ref 14–44)
MCH RBC QN AUTO: 30.4 PG (ref 26.8–34.3)
MCHC RBC AUTO-ENTMCNC: 34.1 G/DL (ref 31.4–37.4)
MCV RBC AUTO: 89 FL (ref 82–98)
MONOCYTES # BLD AUTO: 0.59 THOUSAND/ÂΜL (ref 0.17–1.22)
MONOCYTES NFR BLD AUTO: 8 % (ref 4–12)
NEUTROPHILS # BLD AUTO: 3.4 THOUSANDS/ÂΜL (ref 1.85–7.62)
NEUTS SEG NFR BLD AUTO: 46 % (ref 43–75)
NRBC BLD AUTO-RTO: 0 /100 WBCS
PLATELET # BLD AUTO: 324 THOUSANDS/UL (ref 149–390)
PMV BLD AUTO: 10.2 FL (ref 8.9–12.7)
POTASSIUM SERPL-SCNC: 3.4 MMOL/L (ref 3.5–5.3)
PROT SERPL-MCNC: 7.5 G/DL (ref 6.4–8.4)
RBC # BLD AUTO: 4.74 MILLION/UL (ref 3.88–5.62)
SODIUM SERPL-SCNC: 138 MMOL/L (ref 135–147)
WBC # BLD AUTO: 7.31 THOUSAND/UL (ref 4.31–10.16)

## 2024-07-27 PROCEDURE — 83690 ASSAY OF LIPASE: CPT | Performed by: STUDENT IN AN ORGANIZED HEALTH CARE EDUCATION/TRAINING PROGRAM

## 2024-07-27 PROCEDURE — 80053 COMPREHEN METABOLIC PANEL: CPT | Performed by: STUDENT IN AN ORGANIZED HEALTH CARE EDUCATION/TRAINING PROGRAM

## 2024-07-27 PROCEDURE — 96361 HYDRATE IV INFUSION ADD-ON: CPT

## 2024-07-27 PROCEDURE — 74177 CT ABD & PELVIS W/CONTRAST: CPT

## 2024-07-27 PROCEDURE — 96375 TX/PRO/DX INJ NEW DRUG ADDON: CPT

## 2024-07-27 PROCEDURE — 96365 THER/PROPH/DIAG IV INF INIT: CPT

## 2024-07-27 PROCEDURE — 85025 COMPLETE CBC W/AUTO DIFF WBC: CPT | Performed by: STUDENT IN AN ORGANIZED HEALTH CARE EDUCATION/TRAINING PROGRAM

## 2024-07-27 PROCEDURE — 99285 EMERGENCY DEPT VISIT HI MDM: CPT | Performed by: STUDENT IN AN ORGANIZED HEALTH CARE EDUCATION/TRAINING PROGRAM

## 2024-07-27 PROCEDURE — 36415 COLL VENOUS BLD VENIPUNCTURE: CPT | Performed by: STUDENT IN AN ORGANIZED HEALTH CARE EDUCATION/TRAINING PROGRAM

## 2024-07-27 PROCEDURE — 99283 EMERGENCY DEPT VISIT LOW MDM: CPT

## 2024-07-27 RX ORDER — LIDOCAINE HYDROCHLORIDE 20 MG/ML
15 SOLUTION OROPHARYNGEAL ONCE
Status: COMPLETED | OUTPATIENT
Start: 2024-07-27 | End: 2024-07-27

## 2024-07-27 RX ORDER — SUCRALFATE 1 G/1
1 TABLET ORAL 4 TIMES DAILY
Qty: 84 TABLET | Refills: 0 | Status: SHIPPED | OUTPATIENT
Start: 2024-07-27 | End: 2024-08-17

## 2024-07-27 RX ORDER — MAGNESIUM SULFATE 1 G/100ML
1 INJECTION INTRAVENOUS ONCE
Status: COMPLETED | OUTPATIENT
Start: 2024-07-27 | End: 2024-07-27

## 2024-07-27 RX ORDER — MORPHINE SULFATE 4 MG/ML
4 INJECTION, SOLUTION INTRAMUSCULAR; INTRAVENOUS ONCE
Status: COMPLETED | OUTPATIENT
Start: 2024-07-27 | End: 2024-07-27

## 2024-07-27 RX ORDER — ONDANSETRON 2 MG/ML
4 INJECTION INTRAMUSCULAR; INTRAVENOUS ONCE
Status: COMPLETED | OUTPATIENT
Start: 2024-07-27 | End: 2024-07-27

## 2024-07-27 RX ORDER — MAGNESIUM HYDROXIDE/ALUMINUM HYDROXICE/SIMETHICONE 120; 1200; 1200 MG/30ML; MG/30ML; MG/30ML
30 SUSPENSION ORAL ONCE
Status: COMPLETED | OUTPATIENT
Start: 2024-07-27 | End: 2024-07-27

## 2024-07-27 RX ADMIN — ALUMINUM HYDROXIDE, MAGNESIUM HYDROXIDE, DIMETHICONE 30 ML: 400; 400; 40 SUSPENSION ORAL at 16:06

## 2024-07-27 RX ADMIN — SODIUM CHLORIDE 1000 ML: 0.9 INJECTION, SOLUTION INTRAVENOUS at 13:36

## 2024-07-27 RX ADMIN — MORPHINE SULFATE 4 MG: 4 INJECTION INTRAVENOUS at 13:34

## 2024-07-27 RX ADMIN — LIDOCAINE HYDROCHLORIDE 15 ML: 20 SOLUTION ORAL; TOPICAL at 16:06

## 2024-07-27 RX ADMIN — IOHEXOL 100 ML: 350 INJECTION, SOLUTION INTRAVENOUS at 14:20

## 2024-07-27 RX ADMIN — MAGNESIUM SULFATE HEPTAHYDRATE 1 G: 1 INJECTION, SOLUTION INTRAVENOUS at 14:19

## 2024-07-27 RX ADMIN — ONDANSETRON 4 MG: 2 INJECTION INTRAMUSCULAR; INTRAVENOUS at 13:34

## 2024-07-27 NOTE — DISCHARGE INSTRUCTIONS
Continue to take your medications as prescribed.  You can take Carafate to help with your upper GI symptoms.  Modified diet.    Follow-up with gastroenterology and primary care physician.  Referral placed for vascular surgery.    Return for any new or worsening symptoms such as back pain, weakness in legs.

## 2024-08-02 NOTE — ED PROVIDER NOTES
"History  Chief Complaint   Patient presents with    Muscle Pain     Pt reports he has been having muscle pain, RUQ abd pain, and worsening nausea/ reflux for the past few weeks. Had labs drawn and was told his \"cholesterol was through the roof, bilirubin was elevated.\" Today his hands \"locked up\" and wasn't able to move them, they have since relaxed but is shaky.      HPI    Patient is a 34-year-old male present emerged department with multiple concerns.  Patient reports having abdominal discomfort on the right side.  Nothing seems to make it better or worse.  Pain has been intermittent.  Reports associated nausea and reflux for the past few weeks.  Patient denies any fevers or chills.  Patient also reports having tingling in his hands and feet.  Denies any change in bowel bladder habits.  History includes GERD and allergies.    Prior to Admission Medications   Prescriptions Last Dose Informant Patient Reported? Taking?   acetaminophen (TYLENOL) 160 mg/5 mL liquid   No No   Sig: Take 31.3 mL (1,000 mg total) by mouth every 4 (four) hours as needed for moderate pain   ibuprofen (MOTRIN) 100 mg/5 mL suspension   No No   Sig: Take 20 mL (400 mg total) by mouth every 6 (six) hours as needed for mild pain for up to 10 days   levocetirizine (XYZAL) 5 MG tablet  Self Yes No   Sig: Take 5 mg by mouth daily   methylPREDNISolone 4 MG tablet therapy pack   No No   Sig: Use as directed on package   Patient not taking: Reported on 5/21/2022   methylPREDNISolone 4 MG tablet therapy pack   No No   Sig: Use as directed on package   methylPREDNISolone 4 MG tablet therapy pack   No No   Sig: Use as directed on package   omeprazole (PriLOSEC) 40 MG capsule  Self Yes No   Sig: Take 40 mg by mouth daily   oxyCODONE (Roxicodone) 5 immediate release tablet   No No   Sig: Take 1 tablet (5 mg total) by mouth every 6 (six) hours as needed for moderate pain Max Daily Amount: 20 mg   oxyCODONE (Roxicodone) 5 immediate release tablet   No No "   Sig: Take 1 tablet (5 mg total) by mouth every 6 (six) hours as needed for moderate pain for up to 20 doses Max Daily Amount: 20 mg   Patient not taking: Reported on 5/21/2022      Facility-Administered Medications: None       Past Medical History:   Diagnosis Date    Environmental allergies     GERD (gastroesophageal reflux disease)     Stomach problems        Past Surgical History:   Procedure Laterality Date    NH TONSILLECTOMY PRIMARY/SECONDARY AGE 14/> N/A 1/28/2022    Procedure: TONSILLECTOMY;  Surgeon: Leonard Gorman MD;  Location: [facility];  Service: ENT    WISDOM TOOTH EXTRACTION         Family History   Problem Relation Age of Onset    No Known Problems Mother     Diabetes Father     No Known Problems Sister     Diabetes Family     Hypertension Family     Arthritis Family      I have reviewed and agree with the history as documented.    E-Cigarette/Vaping    E-Cigarette Use Never User      E-Cigarette/Vaping Substances    Nicotine No     THC No     CBD No     Flavoring No     Other No     Unknown No      Social History     Tobacco Use    Smoking status: Former     Current packs/day: 0.00     Average packs/day: 0.5 packs/day for 15.0 years (7.5 ttl pk-yrs)     Types: Cigarettes     Start date: 07/2006     Quit date: 07/2021     Years since quitting: 3.0    Smokeless tobacco: Never   Vaping Use    Vaping status: Never Used   Substance Use Topics    Alcohol use: Never    Drug use: Yes     Frequency: 7.0 times per week     Types: Marijuana       Review of Systems   Constitutional:  Negative for chills and fever.   HENT:  Negative for ear pain and sore throat.    Eyes:  Negative for pain and visual disturbance.   Respiratory:  Negative for cough and shortness of breath.    Cardiovascular:  Negative for chest pain and palpitations.   Gastrointestinal:  Positive for abdominal pain and nausea. Negative for vomiting.   Genitourinary:  Negative for dysuria and hematuria.   Musculoskeletal:  Negative for  arthralgias and back pain.   Skin:  Negative for color change and rash.   Neurological:  Negative for seizures and syncope.   All other systems reviewed and are negative.      Physical Exam  Physical Exam  Vitals and nursing note reviewed.   Constitutional:       General: He is not in acute distress.     Appearance: He is well-developed.   HENT:      Head: Normocephalic and atraumatic.   Eyes:      Conjunctiva/sclera: Conjunctivae normal.   Cardiovascular:      Rate and Rhythm: Normal rate and regular rhythm.      Heart sounds: No murmur heard.  Pulmonary:      Effort: Pulmonary effort is normal. No respiratory distress.      Breath sounds: Normal breath sounds.   Abdominal:      Palpations: Abdomen is soft.      Tenderness: There is abdominal tenderness.   Musculoskeletal:         General: No swelling.      Cervical back: Neck supple.   Skin:     General: Skin is warm and dry.      Capillary Refill: Capillary refill takes less than 2 seconds.   Neurological:      General: No focal deficit present.      Mental Status: He is alert and oriented to person, place, and time.   Psychiatric:         Mood and Affect: Mood normal.         Vital Signs  ED Triage Vitals   Temperature Pulse Respirations Blood Pressure SpO2   07/27/24 1232 07/27/24 1232 07/27/24 1232 07/27/24 1232 07/27/24 1232   98.1 °F (36.7 °C) 80 20 168/93 100 %      Temp Source Heart Rate Source Patient Position - Orthostatic VS BP Location FiO2 (%)   07/27/24 1232 07/27/24 1232 07/27/24 1232 07/27/24 1232 --   Oral Monitor Lying Right arm       Pain Score       07/27/24 1253       5           Vitals:    07/27/24 1232   BP: 168/93   Pulse: 80   Patient Position - Orthostatic VS: Lying         Visual Acuity      ED Medications  Medications   sodium chloride 0.9 % bolus 1,000 mL (0 mL Intravenous Stopped 7/27/24 1536)   morphine injection 4 mg (4 mg Intravenous Given 7/27/24 1334)   magnesium sulfate IVPB (premix) SOLN 1 g (0 g Intravenous Stopped 7/27/24  1519)   ondansetron (ZOFRAN) injection 4 mg (4 mg Intravenous Given 7/27/24 1334)   iohexol (OMNIPAQUE) 350 MG/ML injection (MULTI-DOSE) 100 mL (100 mL Intravenous Given 7/27/24 1420)   aluminum-magnesium hydroxide-simethicone (MAALOX) oral suspension 30 mL (30 mL Oral Given 7/27/24 1606)   Lidocaine Viscous HCl (XYLOCAINE) 2 % mucosal solution 15 mL (15 mL Swish & Spit Given 7/27/24 1606)       Diagnostic Studies  Results Reviewed       Procedure Component Value Units Date/Time    Lipase [[id number]]  (Abnormal) Collected: 07/27/24 1333    Lab Status: Final result Specimen: Blood from Arm, Right Updated: 07/27/24 1414     Lipase 10 u/L     Comprehensive metabolic panel [[id number]]  (Abnormal) Collected: 07/27/24 1333    Lab Status: Final result Specimen: Blood from Arm, Right Updated: 07/27/24 1414     Sodium 138 mmol/L      Potassium 3.4 mmol/L      Chloride 102 mmol/L      CO2 25 mmol/L      ANION GAP 11 mmol/L      BUN 8 mg/dL      Creatinine 1.07 mg/dL      Glucose 104 mg/dL      Calcium 9.6 mg/dL      AST 15 U/L      ALT 18 U/L      Alkaline Phosphatase 45 U/L      Total Protein 7.5 g/dL      Albumin 4.8 g/dL      Total Bilirubin 1.55 mg/dL      eGFR 91 ml/min/1.73sq m     Narrative:      National Kid[employer] Disease Foundation guidelines for Chronic Kidney Disease (CKD):     Stage 1 with normal or high GFR (GFR > 90 mL/min/1.73 square meters)    Stage 2 Mild CKD (GFR = 60-89 mL/min/1.73 square meters)    Stage 3A Moderate CKD (GFR = 45-59 mL/min/1.73 square meters)    Stage 3B Moderate CKD (GFR = 30-44 mL/min/1.73 square meters)    Stage 4 Severe CKD (GFR = 15-29 mL/min/1.73 square meters)    Stage 5 End Stage CKD (GFR <15 mL/min/1.73 square meters)  Note: GFR calculation is accurate only with a steady state creatinine    CBC and differential [[id number]] Collected: 07/27/24 1333    Lab Status: Final result Specimen: Blood from Arm, Right Updated: 07/27/24 1348     WBC 7.31 Thousand/uL      RBC 4.74 Million/uL       Hemoglobin 14.4 g/dL      Hematocrit 42.2 %      MCV 89 fL      MCH 30.4 pg      MCHC 34.1 g/dL      RDW 12.3 %      MPV 10.2 fL      Platelets 324 Thousands/uL      nRBC 0 /100 WBCs      Segmented % 46 %      Immature Grans % 0 %      Lymphocytes % 42 %      Monocytes % 8 %      Eosinophils Relative 3 %      Basophils Relative 1 %      Absolute Neutrophils 3.40 Thousands/µL      Absolute Immature Grans 0.02 Thousand/uL      Absolute Lymphocytes 3.06 Thousands/µL      Absolute Monocytes 0.59 Thousand/µL      Eosinophils Absolute 0.19 Thousand/µL      Basophils Absolute 0.05 Thousands/µL                    CT abdomen pelvis with contrast   Final Result by Lino Mclaughlin MD (07/27 1516)   Addendum (preliminary) 1 of 1 by Lino Mclaughlin MD (07/27 1516)   ADDENDUM:      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 However, a dedicated nonemergent    CT angiogram would be helpful for further characterization.      Final      Questionable dissection flap within the infrarenal abdominal aorta versus artifact. If clinically indicated, a dedicated CT angiogram may be helpful to either further characterize or exclude this possibility.      Hepatic hemangiomas.      Otherwise, no acute CT findings.         Workstation performed: [id number]                    Procedures  Procedures         ED Course                                 SBIRT 20yo+      Flowsheet Row Most Recent Value   Initial Alcohol Screen: US AUDIT-C     1. How often do you have a drink containing alcohol? 0 Filed at: 07/27/2024 1234   2. How many drinks containing alcohol do you have on a typical day you are drinking?  0 Filed at: 07/27/2024 1234   3a. Male UNDER 65: How often do you have five or more drinks on one occasion? 0 Filed at: 07/27/2024 1234   3b. FEMALE Any Age, or MALE 65+: How often do you have 4 or more drinks on one occassion? 0 Filed at: 07/27/2024 1234   Audit-C Score 0 Filed  at: 07/27/2024 1234   OMNY: How many times in the past year have you...    Used an illegal drug or used a prescription medication for non-medical reasons? Never Filed at: 07/27/2024 1234                      Medical Decision Making  Differential gastritis, gastroenteritis, cholecystitis.    Patient is a 34-year-old male present emerged department no acute respiratory distress and vital signs unremarkable.  Patient has some localized discomfort of the right upper quadrant.  Patient does not have any focal neurologic deficits.  Patient has +2 pulses in both radial and dorsalis pedis bilaterally.    Patient has some improvement with symptoms with interventions given.  Lab work overall is unremarkable.  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  I spoke to the radiologist who performed the reading.  Physician noted that this was in a previous MRI performed approximately 2 years ago.  Discussed the clinical picture in case.  Patient's current symptoms likely have no correlation to this image finding.    Patient was informed of imaging findings and need for follow-up.  Discussed patient can have an outpatient CTA performed as well as follow-up with vascular surgery.    Patient does endorse indigestion with history of prior GERD.  Did have improvement with GI cocktail.  Images likely incidental appropriate follow-up given.    Discussed symptomatic management as well as return follow-up precautions.  Disposition discharge.    Amount and/or Complexity of Data Reviewed  Labs: ordered.  Radiology: ordered.    Risk  OTC drugs.  Prescription drug management.                 Disposition  Final diagnoses:   Abdominal pain   Abnormal CT scan     Time reflects when diagnosis was documented in both MDM as applicable and the Disposition within this note       Time User Action Codes Description Comment    7/27/2024  3:57 PM Ivon Ta Add [R10.9] Abdominal pain     7/27/2024  3:58 PM Cely  Ivon GRIMES Add [R93.89] Abnormal CT scan           ED Disposition       ED Disposition   Discharge    Condition   Stable    Date/Time   Sat Jul 27, 2024 1556    Comment   Trey Pennington discharge to home/self care.                   Follow-up Information       Follow up With Specialties Details Why Contact Info    Brendon Amador MD    [address]  [facility] [address]  [phone]              Discharge Medication List as of 7/27/2024  3:58 PM        START taking these medications    Details   sucralfate (CARAFATE) 1 g tablet Take 1 tablet (1 g total) by mouth 4 (four) times a day for 21 days Crush tablet and place in water., Starting Sat 7/27/2024, Until Sat 8/17/2024, Normal           CONTINUE these medications which have NOT CHANGED    Details   acetaminophen (TYLENOL) 160 mg/5 mL liquid Take 31.3 mL (1,000 mg total) by mouth every 4 (four) hours as needed for moderate pain, Starting Mon 1/31/2022, Normal      ibuprofen (MOTRIN) 100 mg/5 mL suspension Take 20 mL (400 mg total) by mouth every 6 (six) hours as needed for mild pain for up to 10 days, Starting Mon 1/31/2022, Until Thu 2/10/2022 at 2359, Normal      levocetirizine (XYZAL) 5 MG tablet Take 5 mg by mouth daily, Starting Fri 9/24/2021, Historical Med      !! methylPREDNISolone 4 MG tablet therapy pack Use as directed on package, Normal      !! methylPREDNISolone 4 MG tablet therapy pack Use as directed on package, Normal      !! methylPREDNISolone 4 MG tablet therapy pack Use as directed on package, Normal      omeprazole (PriLOSEC) 40 MG capsule Take 40 mg by mouth daily, Starting Tue 8/17/2021, Historical Med      !! oxyCODONE (Roxicodone) 5 immediate release tablet Take 1 tablet (5 mg total) by mouth every 6 (six) hours as needed for moderate pain Max Daily Amount: 20 mg, Starting Fri 1/28/2022, Normal      !! oxyCODONE (Roxicodone) 5 immediate release tablet Take 1 tablet (5 mg total) by mouth every 6 (six) hours as needed for moderate pain for up to 20  doses Max Daily Amount: 20 mg, Starting Fri 2/4/2022, Normal       !! - Potential duplicate medications found. Please discuss with provider.              PDMP Review       None            ED Provider  Electronically Signed by             Ivon Ta DO  08/02/24 0018

## 2024-11-20 ENCOUNTER — CONSULT (OUTPATIENT)
Dept: VASCULAR SURGERY | Facility: CLINIC | Age: 34
End: 2024-11-20
Payer: COMMERCIAL

## 2024-11-20 VITALS
BODY MASS INDEX: 30.35 KG/M2 | OXYGEN SATURATION: 99 % | HEIGHT: 70 IN | WEIGHT: 212 LBS | SYSTOLIC BLOOD PRESSURE: 142 MMHG | DIASTOLIC BLOOD PRESSURE: 94 MMHG | HEART RATE: 67 BPM

## 2024-11-20 DIAGNOSIS — R93.89 ABNORMAL CT SCAN: ICD-10-CM

## 2024-11-20 PROCEDURE — 99204 OFFICE O/P NEW MOD 45 MIN: CPT | Performed by: SURGERY

## 2024-11-20 RX ORDER — PANTOPRAZOLE SODIUM 40 MG/1
1 TABLET, DELAYED RELEASE ORAL DAILY
COMMUNITY
Start: 2024-10-21

## 2024-11-20 NOTE — PROGRESS NOTES
Name: Trey Pennington      : 1990      MRN: [id number]  Encounter Provider: Alexis Oneal MD  Encounter Date: 2024   Encounter department: [facility]  :  Assessment & Plan  Abnormal CT scan  34-year-old male with a history of chronic abdominal pain over many years presenting to the office after an abnormal CT scan finding.  Patient reports back in July he was in the ED due to severe abdominal pain.  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  Patient has no history of aortopathy's himself or his family.  There is a history of heart disease in his grandfather and father.   This lesion was also seen on MRI back in  so unsure as to how long the patient had this lesion in the distal abdominal aorta. Of note the patient does have a blood pressure of 142/94 and reports having do some heavy lifting at work times.    I spoke with the patient in detail about what a dissection is.  Given that the patient has had no change in the distal abdominal aortic lesion unlikely that this is something we need to continue monitoring right now.  Unsure if is truly a dissection or perhaps a webbing on the distal aorta.  I did recommend the patient ensure that he talk to his PCP about blood pressure control as this would be the most important factor for him in terms of reducing his risk of further dissection.  Patient can be clinically monitored and follow-up with us as needed.    Orders:    Ambulatory Referral to Vascular Surgery        History of Present Illness         HPI  Trey Pennington is a 34 y.o. male who presents     Patient is new to our office. Patient is here today to review results of a CT ABD/Pelvis with contrast done 2024. Patient c/o abdominal pain every day. On 2024 when he was seen in the [facility] ED he describes the abdominal pain as extreme. He reports that he has  abd pain every day and abd pain after eating. He c/o chronic low back pain. He is a former smoker.     History obtained from: patient    Review of Systems   Constitutional: Negative.    HENT: Negative.     Eyes: Negative.    Respiratory: Negative.     Cardiovascular: Negative.    Gastrointestinal:  Positive for abdominal pain.   Endocrine: Negative.    Genitourinary: Negative.    Musculoskeletal:  Positive for back pain.   Skin: Negative.    Allergic/Immunologic: Negative.    Neurological: Negative.    Hematological: Negative.    Psychiatric/Behavioral: Negative.       Past Medical History   Past Medical History:   Diagnosis Date    Environmental allergies     GERD (gastroesophageal reflux disease)     Stomach problems      Past Surgical History:   Procedure Laterality Date    ID TONSILLECTOMY PRIMARY/SECONDARY AGE 14/> N/A 1/28/2022    Procedure: TONSILLECTOMY;  Surgeon: Leonard Gorman MD;  Location: [facility];  Service: ENT    WISDOM TOOTH EXTRACTION       Family History   Problem Relation Age of Onset    No Known Problems Mother     Diabetes Father     No Known Problems Sister     Diabetes Family     Hypertension Family     Arthritis Family       reports that he quit smoking about 3 years ago. His smoking use included cigarettes. He started smoking about 18 years ago. He has a 7.5 pack-year smoking history. He has never used smokeless tobacco. He reports current drug use. Frequency: 7.00 times per week. Drug: Marijuana. He reports that he does not drink alcohol.  Current Outpatient Medications on File Prior to Visit   Medication Sig Dispense Refill    acetaminophen (TYLENOL) 160 mg/5 mL liquid Take 31.3 mL (1,000 mg total) by mouth every 4 (four) hours as needed for moderate pain 1000 mL 0    levocetirizine (XYZAL) 5 MG tablet Take 5 mg by mouth daily      pantoprazole (PROTONIX) 40 mg tablet Take 1 tablet by mouth in the morning      ibuprofen (MOTRIN) 100 mg/5 mL suspension Take 20 mL (400 mg total) by  "mouth every 6 (six) hours as needed for mild pain for up to 10 days 1000 mL 0    methylPREDNISolone 4 MG tablet therapy pack Use as directed on package 1 tablet 0    methylPREDNISolone 4 MG tablet therapy pack Use as directed on package 1 tablet 0    methylPREDNISolone 4 MG tablet therapy pack Use as directed on package 1 tablet 0    oxyCODONE (Roxicodone) 5 immediate release tablet Take 1 tablet (5 mg total) by mouth every 6 (six) hours as needed for moderate pain Max Daily Amount: 20 mg 30 tablet 0    oxyCODONE (Roxicodone) 5 immediate release tablet Take 1 tablet (5 mg total) by mouth every 6 (six) hours as needed for moderate pain for up to 20 doses Max Daily Amount: 20 mg 20 tablet 0    sucralfate (CARAFATE) 1 g tablet Take 1 tablet (1 g total) by mouth 4 (four) times a day for 21 days Crush tablet and place in water. 84 tablet 0    [DISCONTINUED] omeprazole (PriLOSEC) 40 MG capsule Take 40 mg by mouth daily       No current facility-administered medications on file prior to visit.   No Known Allergies   Medical History Reviewed by provider this encounter:     .     Objective   /94 (BP Location: Left arm, Patient Position: Sitting, Cuff Size: Standard)   Pulse 67   Ht 5' 9.5\" (1.765 m)   Wt 96.2 kg (212 lb)   SpO2 99%   BMI 30.86 kg/m²      Physical Exam  Vitals and nursing note reviewed.   Constitutional:       Appearance: He is well-developed.   HENT:      Head: Normocephalic and atraumatic.   Eyes:      Conjunctiva/sclera: Conjunctivae normal.   Cardiovascular:      Rate and Rhythm: Normal rate and regular rhythm.   Pulmonary:      Effort: Pulmonary effort is normal.      Breath sounds: Normal breath sounds.   Abdominal:      Palpations: Abdomen is soft.      Tenderness: There is no abdominal tenderness.   Musculoskeletal:      Cervical back: Neck supple.   Skin:     General: Skin is warm and dry.      Capillary Refill: Capillary refill takes less than 2 seconds.   Neurological:      General: No " focal deficit present.      Mental Status: He is alert and oriented to person, place, and time.   Psychiatric:         Mood and Affect: Mood normal.         Administrative Statements   I have spent a total time of 45 minutes in caring for this patient on the day of the visit/encounter including Diagnostic results, Prognosis, Risks and benefits of tx options, Instructions for management, Patient and family education, Importance of tx compliance, Risk factor reductions, Impressions, Counseling / Coordination of care, Documenting in the medical record, Reviewing / ordering tests, medicine, procedures  , and Obtaining or reviewing history  .

## 2024-11-20 NOTE — ASSESSMENT & PLAN NOTE
34-year-old male with a history of chronic abdominal pain over many years presenting to the office after an abnormal CT scan finding.  Patient reports back in July he was in the ED due to severe abdominal pain.  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  Patient has no history of aortopathy's himself or his family.  There is a history of heart disease in his grandfather and father.   This lesion was also seen on MRI back in 2022 so unsure as to how long the patient had this lesion in the distal abdominal aorta. Of note the patient does have a blood pressure of 142/94 and reports having do some heavy lifting at work times.    I spoke with the patient in detail about what a dissection is.  Given that the patient has had no change in the distal abdominal aortic lesion unlikely that this is something we need to continue monitoring right now.  Unsure if is truly a dissection or perhaps a webbing on the distal aorta.  I did recommend the patient ensure that he talk to his PCP about blood pressure control as this would be the most important factor for him in terms of reducing his risk of further dissection.  Patient can be clinically monitored and follow-up with us as needed.    Orders:    Ambulatory Referral to Vascular Surgery

## (undated) DEVICE — TUBING SUCTION 5MM X 12 FT

## (undated) DEVICE — STRAIGHT CATH RED RUBBER 12FR

## (undated) DEVICE — ANTI-FOG SOLUTION WITH FOAM PAD: Brand: DEVON

## (undated) DEVICE — STERILE BETHLEHEM T AND A PACK: Brand: CARDINAL HEALTH

## (undated) DEVICE — GLOVE SRG BIOGEL ECLIPSE 7.5

## (undated) DEVICE — WAND COBLATION  EVAC 70 XTRA TONSIL

## (undated) DEVICE — SYRINGE BULB 2 OZ